# Patient Record
Sex: MALE | URBAN - METROPOLITAN AREA
[De-identification: names, ages, dates, MRNs, and addresses within clinical notes are randomized per-mention and may not be internally consistent; named-entity substitution may affect disease eponyms.]

---

## 2023-05-01 ENCOUNTER — OFFICE VISIT (OUTPATIENT)
Dept: URBAN - METROPOLITAN AREA CLINIC 82 | Facility: CLINIC | Age: 68
End: 2023-05-01
Payer: MEDICARE

## 2023-05-01 DIAGNOSIS — H11.002 PTERYGIUM OF LEFT EYE: ICD-10-CM

## 2023-05-01 DIAGNOSIS — H11.001 PTERYGIUM OF RIGHT EYE: ICD-10-CM

## 2023-05-01 DIAGNOSIS — H25.13 AGE-RELATED NUCLEAR CATARACT, BILATERAL: Primary | ICD-10-CM

## 2023-05-01 PROCEDURE — 92082 INTERMEDIATE VISUAL FIELD XM: CPT | Performed by: OPTOMETRIST

## 2023-05-01 PROCEDURE — 99204 OFFICE O/P NEW MOD 45 MIN: CPT | Performed by: OPTOMETRIST

## 2023-05-01 ASSESSMENT — INTRAOCULAR PRESSURE
OD: 19
OS: 19

## 2023-05-01 ASSESSMENT — VISUAL ACUITY
OD: 20/40
OS: 20/30

## 2023-05-01 ASSESSMENT — KERATOMETRY
OS: 43.00
OD: 42.88

## 2023-05-01 NOTE — IMPRESSION/PLAN
Impression: Pterygium of left eye: H11.002. Plan: Discussed diagnosis in detail with patient. Advised patient to wear sunglasses while outside. Recommended artificial tears PRN.

## 2023-05-01 NOTE — IMPRESSION/PLAN
Impression: Pterygium of right eye: H11.001. Plan: Discussed diagnosis in detail with patient. Advised patient to wear sunglasses while outside. Recommended artificial tears PRN.